# Patient Record
Sex: MALE | Race: WHITE | NOT HISPANIC OR LATINO | URBAN - METROPOLITAN AREA
[De-identification: names, ages, dates, MRNs, and addresses within clinical notes are randomized per-mention and may not be internally consistent; named-entity substitution may affect disease eponyms.]

---

## 2023-05-19 ENCOUNTER — APPOINTMENT (OUTPATIENT)
Dept: URBAN - METROPOLITAN AREA SURGERY 17 | Age: 73
Setting detail: DERMATOLOGY
End: 2023-05-22

## 2023-05-19 VITALS
WEIGHT: 168 LBS | HEART RATE: 60 BPM | TEMPERATURE: 98.1 F | DIASTOLIC BLOOD PRESSURE: 78 MMHG | SYSTOLIC BLOOD PRESSURE: 122 MMHG | RESPIRATION RATE: 14 BRPM | HEIGHT: 72 IN

## 2023-05-19 DIAGNOSIS — Z85.828 PERSONAL HISTORY OF OTHER MALIGNANT NEOPLASM OF SKIN: ICD-10-CM

## 2023-05-19 PROBLEM — C44.321 SQUAMOUS CELL CARCINOMA OF SKIN OF NOSE: Status: ACTIVE | Noted: 2023-05-19

## 2023-05-19 PROCEDURE — OTHER MOHS SURGERY: OTHER

## 2023-05-19 PROCEDURE — OTHER MIPS QUALITY: OTHER

## 2023-05-19 PROCEDURE — OTHER COUNSELING: OTHER

## 2023-05-19 PROCEDURE — OTHER CONSULTATION FOR MOHS SURGERY: OTHER

## 2023-05-19 PROCEDURE — 17311 MOHS 1 STAGE H/N/HF/G: CPT

## 2023-05-19 NOTE — PROCEDURE: CONSULTATION FOR MOHS SURGERY
Name Of The Referring Provider For Procedure: Abdi oDss MD Name Of The Referring Provider For Procedure: Abdi Doss MD

## 2023-05-19 NOTE — PROCEDURE: MOHS SURGERY

## 2023-05-19 NOTE — PROCEDURE: MOHS SURGERY
LMOVM TO HOLD 2 DAYS PRIOR Staged Advancement Flap Text: The defect edges were debeveled with a #15 scalpel blade.  Given the location of the defect, shape of the defect and the proximity to free margins a staged advancement flap was deemed most appropriate.  Using a sterile surgical marker, an appropriate advancement flap was drawn incorporating the defect and placing the expected incisions within the relaxed skin tension lines where possible. The area thus outlined was incised deep to adipose tissue with a #15 scalpel blade.  The skin margins were undermined to an appropriate distance in all directions utilizing iris scissors.

## 2023-05-19 NOTE — PROCEDURE: MOHS SURGERY

## 2023-06-13 NOTE — PROCEDURE: MOHS SURGERY
You can access the FollowMyHealth Patient Portal offered by [facility] by registering at the following website: [email or web address]. By joining [facility] [employer] [facility]’s FollowMyHealth portal, you will also be able to view your health information using other applications (apps) compatible with our system. Subsequent Stages Histo Method Verbiage: Tumor remains evident in the tissue sections examined microscopically from the previous stage as indicated by red markings on the map. The patient was returned to appropriate surgical positioning and prepped. Surgery was directed to the area(s) having residual tumor, with thin layers of tissue being excised as indicated on the new reference map. Precise orientation maintained as previously described. Hemostasis achieved. The excised tissue was again oriented, mapped, dyed, and processed for microscopic analysis in the same manner described above. The tissue was then microscopically examined by Dr. Ng.